# Patient Record
Sex: FEMALE | NOT HISPANIC OR LATINO | URBAN - METROPOLITAN AREA
[De-identification: names, ages, dates, MRNs, and addresses within clinical notes are randomized per-mention and may not be internally consistent; named-entity substitution may affect disease eponyms.]

---

## 2017-03-03 ENCOUNTER — EMERGENCY (EMERGENCY)
Facility: HOSPITAL | Age: 27
LOS: 1 days | Discharge: HOME | End: 2017-03-03
Attending: EMERGENCY MEDICINE | Admitting: EMERGENCY MEDICINE
Payer: COMMERCIAL

## 2017-03-03 VITALS
WEIGHT: 250 LBS | SYSTOLIC BLOOD PRESSURE: 145 MMHG | DIASTOLIC BLOOD PRESSURE: 96 MMHG | OXYGEN SATURATION: 99 % | TEMPERATURE: 103 F | HEART RATE: 89 BPM | RESPIRATION RATE: 18 BRPM

## 2017-03-03 VITALS
SYSTOLIC BLOOD PRESSURE: 123 MMHG | DIASTOLIC BLOOD PRESSURE: 84 MMHG | RESPIRATION RATE: 16 BRPM | OXYGEN SATURATION: 100 % | HEART RATE: 83 BPM | TEMPERATURE: 99 F

## 2017-03-03 DIAGNOSIS — Z91.040 LATEX ALLERGY STATUS: ICD-10-CM

## 2017-03-03 DIAGNOSIS — J11.1 INFLUENZA DUE TO UNIDENTIFIED INFLUENZA VIRUS WITH OTHER RESPIRATORY MANIFESTATIONS: ICD-10-CM

## 2017-03-03 DIAGNOSIS — J45.909 UNSPECIFIED ASTHMA, UNCOMPLICATED: ICD-10-CM

## 2017-03-03 DIAGNOSIS — R50.9 FEVER, UNSPECIFIED: ICD-10-CM

## 2017-03-03 DIAGNOSIS — Z88.8 ALLERGY STATUS TO OTHER DRUGS, MEDICAMENTS AND BIOLOGICAL SUBSTANCES STATUS: ICD-10-CM

## 2017-03-03 LAB
ALBUMIN SERPL ELPH-MCNC: 3.8 G/DL — SIGNIFICANT CHANGE UP (ref 3.4–5)
ALP SERPL-CCNC: 62 U/L — SIGNIFICANT CHANGE UP (ref 40–120)
ALT FLD-CCNC: 22 U/L — SIGNIFICANT CHANGE UP (ref 12–42)
ANION GAP SERPL CALC-SCNC: 9 MMOL/L — SIGNIFICANT CHANGE UP (ref 9–16)
AST SERPL-CCNC: 39 U/L — HIGH (ref 15–37)
BASOPHILS NFR BLD AUTO: 0.4 % — SIGNIFICANT CHANGE UP (ref 0–2)
BILIRUB SERPL-MCNC: 1.1 MG/DL — SIGNIFICANT CHANGE UP (ref 0.2–1.2)
BUN SERPL-MCNC: 7 MG/DL — SIGNIFICANT CHANGE UP (ref 7–23)
CALCIUM SERPL-MCNC: 8.6 MG/DL — SIGNIFICANT CHANGE UP (ref 8.5–10.5)
CHLORIDE SERPL-SCNC: 101 MMOL/L — SIGNIFICANT CHANGE UP (ref 96–108)
CO2 SERPL-SCNC: 26 MMOL/L — SIGNIFICANT CHANGE UP (ref 22–31)
CREAT SERPL-MCNC: 0.73 MG/DL — SIGNIFICANT CHANGE UP (ref 0.5–1.3)
EOSINOPHIL NFR BLD AUTO: 1.4 % — SIGNIFICANT CHANGE UP (ref 0–6)
FLUAV H1 2009 PAND RNA SPEC QL NAA+PROBE: DETECTED
GLUCOSE SERPL-MCNC: 96 MG/DL — SIGNIFICANT CHANGE UP (ref 70–99)
HCG SERPL-ACNC: <1 MIU/ML — SIGNIFICANT CHANGE UP
HCT VFR BLD CALC: 37.3 % — SIGNIFICANT CHANGE UP (ref 34.5–45)
HGB BLD-MCNC: 12.1 G/DL — SIGNIFICANT CHANGE UP (ref 11.5–15.5)
LYMPHOCYTES # BLD AUTO: 27.6 % — SIGNIFICANT CHANGE UP (ref 13–44)
MCHC RBC-ENTMCNC: 28.2 PG — SIGNIFICANT CHANGE UP (ref 27–34)
MCHC RBC-ENTMCNC: 32.4 G/DL — SIGNIFICANT CHANGE UP (ref 32–36)
MCV RBC AUTO: 86.9 FL — SIGNIFICANT CHANGE UP (ref 80–100)
MONOCYTES NFR BLD AUTO: 14.9 % — HIGH (ref 2–14)
NEUTROPHILS NFR BLD AUTO: 55.7 % — SIGNIFICANT CHANGE UP (ref 43–77)
PLATELET # BLD AUTO: 180 K/UL — SIGNIFICANT CHANGE UP (ref 150–400)
POTASSIUM SERPL-MCNC: 3.4 MMOL/L — LOW (ref 3.5–5.3)
POTASSIUM SERPL-SCNC: 3.4 MMOL/L — LOW (ref 3.5–5.3)
PROT SERPL-MCNC: 7.3 G/DL — SIGNIFICANT CHANGE UP (ref 6.4–8.2)
RAPID RVP RESULT: DETECTED
RBC # BLD: 4.29 M/UL — SIGNIFICANT CHANGE UP (ref 3.8–5.2)
RBC # FLD: 13.3 % — SIGNIFICANT CHANGE UP (ref 10.3–16.9)
SODIUM SERPL-SCNC: 136 MMOL/L — SIGNIFICANT CHANGE UP (ref 135–145)
WBC # BLD: 7 K/UL — SIGNIFICANT CHANGE UP (ref 3.8–10.5)
WBC # FLD AUTO: 7 K/UL — SIGNIFICANT CHANGE UP (ref 3.8–10.5)

## 2017-03-03 PROCEDURE — 87798 DETECT AGENT NOS DNA AMP: CPT

## 2017-03-03 PROCEDURE — 71046 X-RAY EXAM CHEST 2 VIEWS: CPT

## 2017-03-03 PROCEDURE — 96375 TX/PRO/DX INJ NEW DRUG ADDON: CPT

## 2017-03-03 PROCEDURE — 87633 RESP VIRUS 12-25 TARGETS: CPT

## 2017-03-03 PROCEDURE — 87581 M.PNEUMON DNA AMP PROBE: CPT

## 2017-03-03 PROCEDURE — 80053 COMPREHEN METABOLIC PANEL: CPT

## 2017-03-03 PROCEDURE — 71020: CPT | Mod: 26

## 2017-03-03 PROCEDURE — 85025 COMPLETE CBC W/AUTO DIFF WBC: CPT

## 2017-03-03 PROCEDURE — 87486 CHLMYD PNEUM DNA AMP PROBE: CPT

## 2017-03-03 PROCEDURE — 99284 EMERGENCY DEPT VISIT MOD MDM: CPT

## 2017-03-03 PROCEDURE — 84702 CHORIONIC GONADOTROPIN TEST: CPT

## 2017-03-03 PROCEDURE — 96374 THER/PROPH/DIAG INJ IV PUSH: CPT

## 2017-03-03 PROCEDURE — 94640 AIRWAY INHALATION TREATMENT: CPT

## 2017-03-03 PROCEDURE — 99284 EMERGENCY DEPT VISIT MOD MDM: CPT | Mod: 25

## 2017-03-03 PROCEDURE — 36415 COLL VENOUS BLD VENIPUNCTURE: CPT

## 2017-03-03 RX ORDER — KETOROLAC TROMETHAMINE 30 MG/ML
30 SYRINGE (ML) INJECTION ONCE
Qty: 0 | Refills: 0 | Status: DISCONTINUED | OUTPATIENT
Start: 2017-03-03 | End: 2017-03-03

## 2017-03-03 RX ORDER — SODIUM CHLORIDE 9 MG/ML
1000 INJECTION INTRAMUSCULAR; INTRAVENOUS; SUBCUTANEOUS ONCE
Qty: 0 | Refills: 0 | Status: COMPLETED | OUTPATIENT
Start: 2017-03-03 | End: 2017-03-03

## 2017-03-03 RX ORDER — CEFTRIAXONE 500 MG/1
1 INJECTION, POWDER, FOR SOLUTION INTRAMUSCULAR; INTRAVENOUS ONCE
Qty: 0 | Refills: 0 | Status: COMPLETED | OUTPATIENT
Start: 2017-03-03 | End: 2017-03-03

## 2017-03-03 RX ORDER — ACETAMINOPHEN 500 MG
975 TABLET ORAL ONCE
Qty: 0 | Refills: 0 | Status: COMPLETED | OUTPATIENT
Start: 2017-03-03 | End: 2017-03-03

## 2017-03-03 RX ORDER — BROMPHENIRAMINE MALEATE, PSEUDOEPHEDRINE HYDROCHLORIDE, AND DEXTROMETHORPHAN HYDROBROMIDE 2; 10; 30 MG/5ML; MG/5ML; MG/5ML
10 SOLUTION ORAL
Qty: 210 | Refills: 0 | OUTPATIENT
Start: 2017-03-03 | End: 2017-03-10

## 2017-03-03 RX ORDER — IPRATROPIUM/ALBUTEROL SULFATE 18-103MCG
3 AEROSOL WITH ADAPTER (GRAM) INHALATION ONCE
Qty: 0 | Refills: 0 | Status: COMPLETED | OUTPATIENT
Start: 2017-03-03 | End: 2017-03-03

## 2017-03-03 RX ADMIN — Medication 975 MILLIGRAM(S): at 22:38

## 2017-03-03 RX ADMIN — SODIUM CHLORIDE 2000 MILLILITER(S): 9 INJECTION INTRAMUSCULAR; INTRAVENOUS; SUBCUTANEOUS at 22:38

## 2017-03-03 RX ADMIN — SODIUM CHLORIDE 2000 MILLILITER(S): 9 INJECTION INTRAMUSCULAR; INTRAVENOUS; SUBCUTANEOUS at 21:39

## 2017-03-03 RX ADMIN — Medication 30 MILLIGRAM(S): at 22:06

## 2017-03-03 RX ADMIN — Medication 125 MILLIGRAM(S): at 21:39

## 2017-03-03 RX ADMIN — Medication 3 MILLILITER(S): at 21:40

## 2017-03-03 RX ADMIN — CEFTRIAXONE 100 GRAM(S): 500 INJECTION, POWDER, FOR SOLUTION INTRAMUSCULAR; INTRAVENOUS at 22:53

## 2017-03-03 RX ADMIN — Medication 75 MILLIGRAM(S): at 23:48

## 2017-03-03 RX ADMIN — Medication 30 MILLIGRAM(S): at 22:38

## 2017-03-03 NOTE — ED ADULT NURSE NOTE - OBJECTIVE STATEMENT
27 yr old female patient with c/o cough since January, as per patient cough is getting worse.  Patient states SOB and HELIO x1wk, fever intermittently.  No distress noted.  A+OX3, ambulatory with steady gait. 27 yr old female patient with c/o cough since January intermittently, as per patient cough is getting worse.  Patient states SOB and HELIO x1wk, fever since yesterday with yellow phelgm.  No distress noted.  A+OX3, ambulatory with steady gait.

## 2017-03-03 NOTE — ED PROVIDER NOTE - MEDICAL DECISION MAKING DETAILS
28 yo female with h/o MS, in the ER with fever, chills, dry, non-productive cough, malaise, HA, sore throat. had sick contacts at work. rapid flu test+. Pt received IV steroids, nebulizer treatment, tamiflu, IVF and tylenol- imoproved.   will d/c home with Tamiflu , Albuterol Prednisone for a few days and recommend to f/u with PMD in a few days

## 2017-03-03 NOTE — ED PROVIDER NOTE - OBJECTIVE STATEMENT
26 yo female with h/o MS, in the ER c/o persistent heavy dry cough, associated SOB, CP and back pain, chest tightness , fever and chills. Symptoms are worse for the past 2-3 days. Pt reports that since fall 2016 she is being having this cough and chest tightness intermittently on and off, was  seen in the ER and urgent care centers few times, was diagnosed with asthma and prescribed Albuterol, but her symptoms are not improving at all. Pt mentioned that her boss at work had flu-like cold symptoms with fever for the past week and she probably got some infections from him. Denies night sweats or hemoptysis, denies recent travel.

## 2017-03-03 NOTE — ED PROVIDER NOTE - ATTENDING CONTRIBUTION TO CARE
27 yof pw cough, sob, cp, fc.  hx of MS.  sick contact at work, per pt her boss had flu-like sx last week.  no nvd.      agree w/ PA, febrile, no tachycardia, no tachypnea, no respiratory distress, will check labs, no indication for bd cx, antipyretic, rvp/influenza, reassess    xray done, possible RLL opacity though not appreciated on Lateral view.  ceftriaxone given empirically.    subsequent +flu, tamiflu given.

## 2017-06-19 NOTE — ED ADULT NURSE NOTE - FALL HARM RISK TYPE OF ASSESSMENT
Extraction Method: cotton-tipped applicators and gentle pressure Hemostasis: Pressure Prep Text (Optional): Prior to removal the treatment areas were prepped in the usual Render Number Of Lesions Treated: yes Render Post-Care Instructions In Note?: no Detail Level: Zone Consent was obtained and risks were reviewed including but not limited to scarring, infection, bleeding, scabbing, incomplete removal, and allergy to anesthesia. Post-Care Instructions: I reviewed with the patient in detail post-care instructions. Patient is to keep the treatment areaas dry overnight, and then apply bacitracin twice daily until healed. Patient may apply hydrogen peroxide soaks to remove any crusting. Acne Type: Comedonal Lesions Admission

## 2019-06-12 NOTE — ED ADULT NURSE NOTE - NS ED NURSE LEVEL OF CONSCIOUSNESS AFFECT
You can access the [facility] Patient Portal, offered by [facility], by registering with the following website: [email or web address]
Calm
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
